# Patient Record
Sex: FEMALE | URBAN - METROPOLITAN AREA
[De-identification: names, ages, dates, MRNs, and addresses within clinical notes are randomized per-mention and may not be internally consistent; named-entity substitution may affect disease eponyms.]

---

## 2023-03-15 ENCOUNTER — TELEPHONE (OUTPATIENT)
Dept: PLASTIC SURGERY | Facility: CLINIC | Age: 23
End: 2023-03-15

## 2023-03-15 NOTE — TELEPHONE ENCOUNTER
[facility] Call Center    Phone Message    May a detailed message be left on voicemail: yes     Reason for Call: Other:       Pt is requesting a call back to discuss scheduling an appt with Dr. Brown in regards to top surgery.      Action Taken: Message routed to:  [facility] ([facility]):  [facility]    Travel Screening: Not Applicable

## 2023-03-16 NOTE — CONFIDENTIAL NOTE
Writer called to follow up on request to schedule top surgery consult. Writer discussed options for top surgery surgeons. Pt to research and call back to schedule.